# Patient Record
Sex: FEMALE | Race: WHITE
[De-identification: names, ages, dates, MRNs, and addresses within clinical notes are randomized per-mention and may not be internally consistent; named-entity substitution may affect disease eponyms.]

---

## 2017-12-19 ENCOUNTER — HOSPITAL ENCOUNTER (EMERGENCY)
Dept: HOSPITAL 10 - FTE | Age: 37
LOS: 1 days | Discharge: HOME | End: 2017-12-20
Payer: MEDICAID

## 2017-12-19 VITALS
WEIGHT: 145.51 LBS | BODY MASS INDEX: 27.47 KG/M2 | HEIGHT: 61 IN | HEIGHT: 61 IN | WEIGHT: 145.51 LBS | BODY MASS INDEX: 27.47 KG/M2

## 2017-12-19 DIAGNOSIS — R21: Primary | ICD-10-CM

## 2017-12-19 PROCEDURE — 99284 EMERGENCY DEPT VISIT MOD MDM: CPT

## 2017-12-20 VITALS
SYSTOLIC BLOOD PRESSURE: 111 MMHG | TEMPERATURE: 97.9 F | HEART RATE: 50 BPM | RESPIRATION RATE: 17 BRPM | DIASTOLIC BLOOD PRESSURE: 64 MMHG

## 2017-12-20 NOTE — ERD
ER Documentation


Chief Complaint


Chief Complaint


rash on hands and face





HPI


37-year-old female presents here to emergency department for complaints of 

dryness of the skin and rash itching in the bilateral hands and facial area.  

Patient does not have any lip swelling, tongue swelling or stridor.  Patient 

did not take her medications to help with symptoms.  Patient without any 

shortness of breath or wheezing.  Patient does not have any family members with 

the same type of symptoms.





ROS


All systems reviewed and are negative except as per history of present illness.





Medications


Home Meds


Active Scripts


Hydroxyzine Hcl* (Hydroxyzine Hcl*) 25 Mg Tablet, 25 MG PO Q8H Y for ITCHING, #

30 TAB


   Prov:ELBERT MORGAN NP         12/20/17


Triamcinolone Acetonide (Triamcinolone Acetonide) 0.1% - 15 Gm Cream.gm., 1 

APPLIC TOP BID, #1 TUB


   Prov:ELBERT MORGAN NP         12/20/17


Reported Medications


Acetaminophen (A/F Pain Relief) 500 Mg Tablet


   1/4/11





Allergies


Allergies:  


Coded Allergies:  


     No Known Drug Allergies (Verified  Allergy, Unknown, 11/6/10)





PMhx/Soc


History of Surgery:  Yes (CSECTION)


Anesthesia Reaction:  No


Hx Neurological Disorder:  No


Hx Respiratory Disorders:  No


Hx Cardiac Disorders:  No


Hx Psychiatric Problems:  No


Hx Miscellaneous Medical Probl:  No


Hx Alcohol Use:  No


Hx Substance Use:  No


Hx Tobacco Use:  No


Smoking Status:  Never smoker





FmHx


Family History:  No coronary disease, No diabetes, No other





Physical Exam


Vitals





Vital Signs








  Date Time  Temp Pulse Resp B/P Pulse Ox O2 Delivery O2 Flow Rate FiO2


 


12/20/17 03:00 97.9 50 17 111/64 99 Room Air  


 


12/20/17 00:03 98.3 64 16 112/54 100   








Physical Exam


GENERAL:  The patient is well developed and appropriate for usual state of 

health, in no apparent distress.


CHEST:  Clear to auscultation bilaterally. There are no rales, wheezes or 

rhonchi. 


HEART:  Regular rate and rhythm. No murmurs, clicks, rubs or gallops. No S3 or 

S4.


ABDOMEN:  Soft, nontender and nondistended. Good bowel sounds. No rebound or 

guarding. No gross peritonitis. No gross organomegaly or masses. No Thakur sign 

or McBurney point tenderness.


BACK:  No midline or flank tenderness.


EXTREMITIES:  Equal pulses bilaterally. There is no peripheral clubbing, 

cyanosis or edema. No focal swelling or erythema. Full range of motion. Grossly 

neurovascularly intact.


NEURO:  Alert and oriented. Cranial nerves 2-12 intact. Motor strength in all 4 

extremities with 5/5 strength.  Sensation grossly intact. Normal speech and 

gait. 


SKIN: Noted dryness of the skin and maculopapular rash on the bilateral hands, 

some on the facial area.  tHere is no petechia. The skin is warm and dry.


HEMATOLOGIC AND LYMPHATIC:  There is no evidence of excessive bruising or 

lymphedema. No gross cervical, axillary, or inguinal lymphadenopathy.





Procedures/MDM


Medical decision making: Patient's rash nonspecific at this time, most likely 

some form of dermatitis, can be eczema.  No suspicion of any acute bacterial 

infection, symptoms of any coagulopathies.  No symptoms of any contagious rash 

at this time.  Prescription was given for hydroxyzine, triamcinolone cream, is 

advised to follow-up with dermatology specialist for further evaluation and 

management.  Patient was advised to return to emergency department for any 

worsening symptoms.





Disposition: Home.  Stable.





Departure


Diagnosis:  


 Primary Impression:  


 Rash


Condition:  Stable


Patient Instructions:  Self-Care for Skin Rashes, Dermatitis, Non-Specific











ELBERT MORGAN NP Dec 20, 2017 03:14

## 2018-11-25 ENCOUNTER — HOSPITAL ENCOUNTER (EMERGENCY)
Age: 38
Discharge: HOME | End: 2018-11-25

## 2018-11-25 ENCOUNTER — HOSPITAL ENCOUNTER (EMERGENCY)
Dept: HOSPITAL 91 - FTE | Age: 38
Discharge: HOME | End: 2018-11-25
Payer: MEDICAID

## 2018-11-25 DIAGNOSIS — R21: Primary | ICD-10-CM

## 2018-11-25 PROCEDURE — 99283 EMERGENCY DEPT VISIT LOW MDM: CPT

## 2018-12-20 ENCOUNTER — HOSPITAL ENCOUNTER (EMERGENCY)
Dept: HOSPITAL 91 - FTE | Age: 38
Discharge: HOME | End: 2018-12-20
Payer: MEDICAID

## 2018-12-20 ENCOUNTER — HOSPITAL ENCOUNTER (EMERGENCY)
Age: 38
Discharge: HOME | End: 2018-12-20

## 2018-12-20 DIAGNOSIS — R42: Primary | ICD-10-CM

## 2018-12-20 PROCEDURE — 99283 EMERGENCY DEPT VISIT LOW MDM: CPT

## 2018-12-20 PROCEDURE — 93005 ELECTROCARDIOGRAM TRACING: CPT

## 2018-12-20 RX ADMIN — MECLIZINE 1 MG: 12.5 TABLET ORAL at 16:11

## 2018-12-20 RX ADMIN — ONDANSETRON 1 MG: 4 TABLET, ORALLY DISINTEGRATING ORAL at 16:11

## 2019-01-13 ENCOUNTER — HOSPITAL ENCOUNTER (EMERGENCY)
Dept: HOSPITAL 10 - FTE | Age: 39
Discharge: HOME | End: 2019-01-13
Payer: MEDICAID

## 2019-01-13 ENCOUNTER — HOSPITAL ENCOUNTER (EMERGENCY)
Dept: HOSPITAL 91 - FTE | Age: 39
Discharge: HOME | End: 2019-01-13
Payer: MEDICAID

## 2019-01-13 VITALS — RESPIRATION RATE: 19 BRPM | DIASTOLIC BLOOD PRESSURE: 70 MMHG | HEART RATE: 71 BPM | SYSTOLIC BLOOD PRESSURE: 109 MMHG

## 2019-01-13 VITALS
BODY MASS INDEX: 31.73 KG/M2 | HEIGHT: 60 IN | BODY MASS INDEX: 31.73 KG/M2 | HEIGHT: 60 IN | WEIGHT: 161.6 LBS | WEIGHT: 161.6 LBS
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
## 2019-01-13 DIAGNOSIS — S92.424A: Primary | ICD-10-CM

## 2019-01-13 PROCEDURE — 73630 X-RAY EXAM OF FOOT: CPT

## 2019-01-13 PROCEDURE — 96372 THER/PROPH/DIAG INJ SC/IM: CPT

## 2019-01-13 PROCEDURE — 29515 APPLICATION SHORT LEG SPLINT: CPT

## 2019-01-13 PROCEDURE — 99284 EMERGENCY DEPT VISIT MOD MDM: CPT

## 2019-01-13 PROCEDURE — 81025 URINE PREGNANCY TEST: CPT

## 2019-01-13 RX ADMIN — KETOROLAC TROMETHAMINE 1 MG: 30 INJECTION, SOLUTION INTRAMUSCULAR at 12:00

## 2019-01-13 NOTE — ERD
ER Documentation


Chief Complaint


Chief Complaint





right foot pain and back pain due to mechanical fall





HPI


38-year-old female presents for left foot pain and low back pain status post 


fall this morning.  She slipped in the bathtub and rolled her toe.  She states 


that the pain is mostly on the left foot first toe.  She states that she has 


pulsatile pain, rated 8 out of 10.  No other modifying factors.





ROS


All systems reviewed and are negative except as per history of present illness.





Medications


Home Meds


Active Scripts


Hydrocodone/Acetaminophen (Norco 5-325 Tablet) 1 Each Tablet, 1 TAB PO Q6H PRN 


for PAIN, #10 TAB


   Prov:VAN CONNELLY DO         1/13/19


Acetaminophen* (Tylenol*) 500 Mg Tab, 500 MG PO Q4H PRN for MILD PAIN LEVEL 1-3,


#30 TAB


   Prov:VAN CONNELLY DO         1/13/19


Ibuprofen* (Motrin*) 600 Mg Tab, 600 MG PO Q6H PRN for PAIN AND OR ELEVATED 


TEMP, #30 TAB


   Prov:VAN CONNELLY DO         1/13/19


Ondansetron (Ondansetron Odt) 8 Mg Tab.rapdis, 8 MG PO Q6H PRN for NAUSEA AND/OR


VOMITING, #10 TAB


   Prov:AMELIA PASTOR MD         12/20/18


Meclizine Hcl* (Meclizine Hcl*) 25 Mg Tablet, 25 MG PO Q8H PRN for DIZZINESS, 


#20 TAB


   Prov:AMELIA PASTOR MD         12/20/18


Cetirizine Hcl* (Zyrtec*) 10 Mg Capsule, 10 MG PO DAILY, #20 TAB.CHEW


   Prov:ZARINA FAUST PA-C         11/25/18


Triamcinolone Acetonide (Triamcinolone Acetonide) 0.1% - 15 Gm Cream.gm., 1 


APPLIC TOP BID for 14 Days, #1 TUB


   Prov:ZARINA FAUST PA-C         11/25/18


Hydroxyzine Hcl* (Hydroxyzine Hcl*) 25 Mg Tablet, 25 MG PO Q8H PRN for ITCHING, 


#30 TAB


   Prov:ELBERT MORGAN NP         12/20/17


Triamcinolone Acetonide (Triamcinolone Acetonide) 0.1% - 15 Gm Cream.gm., 1 


APPLIC TOP BID, #1 TUB


   Prov:ELBERT MORGAN JOSE DANIEL FLORESBenson RODAS         12/20/17


Reported Medications


Acetaminophen (A/F Pain Relief) 500 Mg Tablet


   1/4/11





Allergies


Allergies:  


Coded Allergies:  


     No Known Drug Allergies (Verified  Allergy, Unknown, 12/20/18)





PMhx/Soc


History of Surgery:  Yes (CSECTION, tuballigation, gallbladder)


Anesthesia Reaction:  No


Hx Neurological Disorder:  No


Hx Respiratory Disorders:  No


Hx Cardiac Disorders:  No


Hx Psychiatric Problems:  No


Hx Miscellaneous Medical Probl:  No


Hx Alcohol Use:  No


Hx Substance Use:  No


Hx Tobacco Use:  No





Physical Exam


Vitals


Temperature 97.1, pulse 71, respiration 19, blood pressure 109/70, O2 saturation


98% on room air


Physical Exam


Const:   No acute distress


Resp:   Clear to auscultation bilaterally


Cardio:   Regular rate and rhythm, no murmurs, bilateral radial and dorsalis 


pedis pulses intact


Skin:   No petechiae or rashes


Back:   There is lumbar right paravertebral muscle tenderness to palpation, no 


midline tenderness.


Ext:    Right foot first toe tenderness to palpation there is mild swelling.


Neur:   Awake and alert, bilateral upper and lower extremities sensation intact


Psych:    Normal Mood and Affect


Results 24 hrs





Laboratory Tests


                    Test
                      1/13/19
11:55


                    POC Beta HCG, Qualitative  NEGATIVE





Current Medications


 Medications
   Dose
          Sig/Chris
       Start Time
   Status  Last


 (Trade)       Ordered        Route
 PRN     Stop Time              Admin
Dose


                              Reason                                Admin


 Ketorolac
     30 mg          ONCE  STAT
    1/13/19       DC           1/13/19


Tromethamine
                 IM
            11:42
                       12:00



 (Toradol)                                   1/13/19 11:44








Procedures/MDM


Medical Decision Making:





Differential diagnosis includes but not limited to fracture, dislocation, sprain





Patient appeared well on physical exam.


There was tenderness palpation over the first toe of the left foot.





Left foot x-ray showed Nondisplaced oblique nearly longitudinal fracture 


involving the proximal phalanx of the left great toe.





ED course:


Patient was given Toradol.


Symptoms improved with treatment.  Patient was given crutches and provided with 


a short leg splint, see procedure note above.  Patient was provided with x-ray 


results.





Prescription(s):


Patient given prescription for Norco short course, low-dose, Motrin, Tylenol.





Patient advised follow-up with orthopedic surgery.  Patient given information 


for follow-up.





Patient advised to follow up with PCP in 1-2 days. Patient advised to return to 


ED for new or worsening symptoms. Patient stable on discharge from the ED.





The patient has been prescribed Norco during this encounter.





The patient has been warned about the use of narcotics. The patient should not 


drive or operate heavy machinery while taking this medication. The patient was 


also warned about the addictive properties of narcotic medications. 





Narcan prescription was NOT provided given the following criteria





1. No more than 5 tablets of Norco 10 mg or 10 tablets of Norco 5 mg were 


prescribed.





2. Concomitant opiate and benzodiazepine prescriptions were not provided.





3. There is no obvious evidence of prior history of opiate abuse or overdose.











Disclaimer: Inadvertent spelling and grammatical errors are likely due to 


EHR/dictation software use and do not reflect on the overall quality of patient 


care. Also, please note that the electronic time recorded on this note does not 


necessarily reflect the actual time of the patient encounter.





Departure


Diagnosis:  


   Primary Impression:  


   Foot fracture


Condition:  Fair


Patient Instructions:  Fracture, Foot


Referrals:  


COMMUNITY CLINICS


YOU HAVE RECEIVED A MEDICAL SCREENING EXAM AND THE RESULTS INDICATE THAT YOU DO 


NOT HAVE A CONDITION THAT REQUIRES URGENT TREATMENT IN THE EMERGENCY DEPARTMENT.





FURTHER EVALUATION AND TREATMENT OF YOUR CONDITION CAN WAIT UNTIL YOU ARE SEEN 


IN YOUR DOCTORS OFFICE WITHIN THE NEXT 1-2 DAYS. IT IS YOUR RESPONSIBILITY TO 


MAKE AN APPOINTMENT FOR FOLOW-UP CARE.





IF YOU HAVE A PRIMARY DOCTOR


--you should call your primary doctor and schedule an appointment





IF YOU DO NOT HAVE A PRIMARY DOCTOR YOU CAN CALL OUR PHYSICIAN REFERRAL HOTLINE 


AT


 [phone] 





IF YOU CAN NOT AFFORD TO SEE A PHYSICIAN YOU CAN CHOSE FROM THE FOLLOWING 


[facility] CLINICS





[facility] [phone] [address] [facility]. [facility] [phone] [address] [facility]. [facility] [phone] [address] [facility] [phone] [address] [facility] [phone] [address] [facility]. [facility]. [phone] [address] [facility]. [facility]


Urgent Care





7 a.m.- 11 p.m.


Every Day of the Week





NO APPOINTMENT OR AUTHORIZATION NEEDED


[phone]





Additional Instructions:  


Call your primary care doctor TOMORROW for an appointment during the next 1-2 


days.See the doctor sooner or return here if your condition worsens before your 


appointment time.











VAN CONNELLY DO                 Jan 13, 2019 20:28

## 2024-02-13 ENCOUNTER — HOSPITAL ENCOUNTER (EMERGENCY)
Dept: HOSPITAL 54 - ER | Age: 44
LOS: 1 days | Discharge: HOME | End: 2024-02-14
Payer: MEDICAID

## 2024-02-13 VITALS — BODY MASS INDEX: 32.1 KG/M2 | WEIGHT: 170 LBS | HEIGHT: 61 IN

## 2024-02-13 DIAGNOSIS — Z79.899: ICD-10-CM
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
## 2024-02-13 DIAGNOSIS — M62.830: Primary | ICD-10-CM
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
## 2024-02-13 PROCEDURE — 96372 THER/PROPH/DIAG INJ SC/IM: CPT

## 2024-02-13 PROCEDURE — 72110 X-RAY EXAM L-2 SPINE 4/>VWS: CPT

## 2024-02-13 PROCEDURE — 99283 EMERGENCY DEPT VISIT LOW MDM: CPT

## 2024-02-13 RX ADMIN — ACETAMINOPHEN ONE MG: 500 TABLET ORAL at 23:05

## 2024-02-13 RX ADMIN — KETOROLAC TROMETHAMINE ONE MG: 15 INJECTION, SOLUTION INTRAMUSCULAR; INTRAVENOUS at 22:35

## 2024-02-13 RX ADMIN — CYCLOBENZAPRINE HYDROCHLORIDE ONE MG: 10 TABLET, FILM COATED ORAL at 22:35

## 2024-02-14 VITALS — SYSTOLIC BLOOD PRESSURE: 119 MMHG | DIASTOLIC BLOOD PRESSURE: 70 MMHG | OXYGEN SATURATION: 99 % | TEMPERATURE: 98 F

## 2024-02-14 RX ADMIN — LIDOCAINE SCH EA: 50 PATCH CUTANEOUS at 00:40
